# Patient Record
Sex: FEMALE | Race: WHITE
[De-identification: names, ages, dates, MRNs, and addresses within clinical notes are randomized per-mention and may not be internally consistent; named-entity substitution may affect disease eponyms.]

---

## 2020-01-09 ENCOUNTER — RESULT REVIEW (OUTPATIENT)
Age: 45
End: 2020-01-09

## 2021-05-25 PROBLEM — Z00.00 ENCOUNTER FOR PREVENTIVE HEALTH EXAMINATION: Status: ACTIVE | Noted: 2021-05-25

## 2021-06-08 ENCOUNTER — APPOINTMENT (OUTPATIENT)
Dept: PHYSICAL MEDICINE AND REHAB | Facility: CLINIC | Age: 46
End: 2021-06-08
Payer: COMMERCIAL

## 2021-06-08 ENCOUNTER — NON-APPOINTMENT (OUTPATIENT)
Age: 46
End: 2021-06-08

## 2021-06-08 VITALS
WEIGHT: 130 LBS | HEIGHT: 64 IN | DIASTOLIC BLOOD PRESSURE: 60 MMHG | SYSTOLIC BLOOD PRESSURE: 101 MMHG | TEMPERATURE: 97.6 F | BODY MASS INDEX: 22.2 KG/M2 | HEART RATE: 50 BPM | RESPIRATION RATE: 18 BRPM

## 2021-06-08 DIAGNOSIS — G89.29 LOW BACK PAIN: ICD-10-CM

## 2021-06-08 DIAGNOSIS — G89.29 DORSALGIA, UNSPECIFIED: ICD-10-CM

## 2021-06-08 DIAGNOSIS — M54.9 DORSALGIA, UNSPECIFIED: ICD-10-CM

## 2021-06-08 DIAGNOSIS — M54.5 LOW BACK PAIN: ICD-10-CM

## 2021-06-08 PROCEDURE — 99072 ADDL SUPL MATRL&STAF TM PHE: CPT

## 2021-06-08 PROCEDURE — 99244 OFF/OP CNSLTJ NEW/EST MOD 40: CPT

## 2021-06-08 NOTE — CONSULT LETTER
[FreeTextEntry1] : Dear Dr. MARYLEE DILLING  , \par \par I had the pleasure of evaluating your patient, DANI TOVAR .\par \par Thank you very much for allowing me to participate in the care of this patient. If you have any questions, please do not hesitate to contact me. \par \par Sincerely, \par Koko Araiza MD \par \par ABPMR Board Certified in Physical Medicine and Rehabilitation\par Certified Fellow of AANEM (Neuromuscular and Electrodiagnostic Medicine)\par Subspecialty certified in Sports Medicine (ABPMR)\par \par  of Physical Medicine and Rehabilitation\par [facility] [employer] [facility]\par [facility]\par \par

## 2021-06-08 NOTE — REVIEW OF SYSTEMS
[Fatigue] : fatigue [Joint Pain] : joint pain [Negative] : Heme/Lymph [Fever] : no fever [Recent Change In Weight] : ~T no recent weight change [Lower Ext Edema] : no lower extremity edema [Difficulty Walking] : no difficulty walking

## 2021-06-08 NOTE — PHYSICAL EXAM
[FreeTextEntry1] : PHYSICAL EXAM : OBJECTIVE \par \par GENERAL : Awake ,alert and oriented to time place and person looks a bit depressed \par HEAD : normocephalic and atraumatic \par NECK : supple ,no tracheal deviation ,no thyroid enlargement noted with swallowing\par EYES : sclera and conjunctiva normal no redness,intact extraocular movements \par ENT  : ears and nose normal in appearance -hearing adequate \par PULMONARY: effort normal. No respiratory distress. breathing regular. No wheezes \par LYMPH : No swelling in limbs, capillary return within normal range \par CVS : warm extremities,no palpitations,not short of breath, no visible jugular venous distention\par PSYCH : mood and affect normal ,good eye contact ,normal attention \par ABDOMEN : no visible distension , \par NEUROLOGICAL:cranial nerves intact,muscle tone normal,gait and balance safe except where noted below \par SKIN : warm and dry No rash detected over specific body areas examined \par MUSCULOSKELETAL: normal muscle bulk, no focal bony tenderness /posture normal except where specified below\par vibration intact \par heel toe ok\par tandem a bit poorer \par HIp ROM full \par scoliosis on FF levo curve \par ROM spine full \par reflexes sluggish 1 + knees \par ankles \par vibration intact feet \par no wasting thenars \par

## 2021-06-08 NOTE — DATA REVIEWED
[EMG] : EMG [FreeTextEntry1] : EMG [facility] checked \par study date 26 jan 2016 \par comment limited study LE and one motor one sensory UE \par EMG only left leg 4 muscles tested none distal

## 2021-06-08 NOTE — HISTORY OF PRESENT ILLNESS
[FreeTextEntry1] : Ms. DANI TOVAR is a 45 year female  with auto immune chronic conditions  including scleroderma and POTS Dz who is  seen for evaluation of progressive numbness and paresthesia of hands and feet   that has been ongoing for  a few years  without any specific injury or inciting event.\par of note she had an EMG 2016 at [facility] which was deemed normal no neuropathy found \par  Her  pain is located primarily lower back and mid back -she has a scoliosis  intermittent in nature and described as dull achy burning . The pain is rated as 2/10 during today's visit, and ranges from 2-4/10. The patient's symptoms are aggravated by sitting  and alleviated by not much  . The patient denies any night pain,has overall  weakness and malaise of chronic illness, no bowel/bladder dysfunction. The patient has no other complaints at this time.\par she has not worked for several years \par has private funds not on SSD \par was in retial then at [facility] but could not finish \par the tingling is her soles and finger tips \par

## 2021-06-08 NOTE — ASSESSMENT
[FreeTextEntry1] : \par PLAN AND RECOMMENDATIONS :\par \par We discussed differential diagnosis and clinical impression\par agree with repeat EMG after 5 years ana lilia as symptoms progressive \par also to bear in mind this may be a small fiber neuropathy \par given her auto immune PMH she is at risk for PN \par \par Recommend\par .symptomatic care and support\par  medications NSAIDS as needed -  OTC fine (-personal preference )-(once or twice a day), -warned of  possible GI side effects -advised to take with meals or add over the counter Nexium, if sensitive\par \par  imaging she is requesting spine xrays -explained that her hand symptoms cannot be coming form her lBP \par \par  hydrotherapy /heat / cold for pain\par  continue  spinal precautions including care with bending, lifting, twisting and  carrying.\par \par .\par  relative rest and avoidance of painful activity where possible \par  increasing activity as discussed \par  return for EMG\par Information given to patient about EMG and Nerve Conduction Study Examination including  planning, differential diagnosis to rule in /rule out ,duration of the test ,precautions (if patient on blood thinner.has bleeding disorder or  pace maker device etc -still possible to undergo with care), side effects(benign-limited to short term bruising and discomfort/pain)  \par The protocol of temp checks upon arrival ,disinfection procedure of waiting room and the lab explained- reassured. \par All questions answered. \par Patient instructed to book appointment upon conclusion of appointment\par \par Information sheet ' Answers to your Questions on EMG " forwarded to patient to read prior to testing, with further information about training,background and the procedure itself .\par \par I certify that > 50 % of time spent was spent on counselling and coordination of care and more than 50% face to face directly \par Time spent including review of documents /records/decision making /discussion  amounted  > 60  minutes\par

## 2021-06-28 ENCOUNTER — APPOINTMENT (OUTPATIENT)
Dept: PHYSICAL MEDICINE AND REHAB | Facility: CLINIC | Age: 46
End: 2021-06-28
Payer: COMMERCIAL

## 2021-06-28 DIAGNOSIS — R20.2 ANESTHESIA OF SKIN: ICD-10-CM

## 2021-06-28 DIAGNOSIS — G62.89 OTHER SPECIFIED POLYNEUROPATHIES: ICD-10-CM

## 2021-06-28 DIAGNOSIS — R20.0 ANESTHESIA OF SKIN: ICD-10-CM

## 2021-06-28 PROCEDURE — 95886 MUSC TEST DONE W/N TEST COMP: CPT

## 2021-06-28 PROCEDURE — 95913 NRV CNDJ TEST 13/> STUDIES: CPT

## 2021-06-28 PROCEDURE — 99072 ADDL SUPL MATRL&STAF TM PHE: CPT
